# Patient Record
Sex: MALE | Race: BLACK OR AFRICAN AMERICAN | NOT HISPANIC OR LATINO | Employment: UNEMPLOYED | URBAN - METROPOLITAN AREA
[De-identification: names, ages, dates, MRNs, and addresses within clinical notes are randomized per-mention and may not be internally consistent; named-entity substitution may affect disease eponyms.]

---

## 2024-07-12 ENCOUNTER — HOSPITAL ENCOUNTER (EMERGENCY)
Facility: HOSPITAL | Age: 49
Discharge: HOME | End: 2024-07-12
Attending: EMERGENCY MEDICINE
Payer: MEDICARE

## 2024-07-12 VITALS
BODY MASS INDEX: 38.65 KG/M2 | HEART RATE: 73 BPM | SYSTOLIC BLOOD PRESSURE: 124 MMHG | RESPIRATION RATE: 18 BRPM | WEIGHT: 255 LBS | OXYGEN SATURATION: 97 % | HEIGHT: 68 IN | DIASTOLIC BLOOD PRESSURE: 82 MMHG | TEMPERATURE: 98 F

## 2024-07-12 DIAGNOSIS — U07.1 COVID-19: Primary | ICD-10-CM

## 2024-07-12 DIAGNOSIS — U07.1 COVID-19 VIRUS DETECTED: ICD-10-CM

## 2024-07-12 LAB
ALBUMIN SERPL BCP-MCNC: 3.7 G/DL (ref 3.5–5.2)
ALP SERPL-CCNC: 85 U/L (ref 55–135)
ALT SERPL W/O P-5'-P-CCNC: 30 U/L (ref 10–44)
AMPHET+METHAMPHET UR QL: NEGATIVE
ANION GAP SERPL CALC-SCNC: 9 MMOL/L (ref 8–16)
AST SERPL-CCNC: 30 U/L (ref 10–40)
BARBITURATES UR QL SCN>200 NG/ML: NEGATIVE
BASOPHILS # BLD AUTO: 0.03 K/UL (ref 0–0.2)
BASOPHILS NFR BLD: 0.5 % (ref 0–1.9)
BENZODIAZ UR QL SCN>200 NG/ML: NEGATIVE
BILIRUB SERPL-MCNC: 0.2 MG/DL (ref 0.1–1)
BILIRUB UR QL STRIP: NEGATIVE
BNP SERPL-MCNC: 4 PG/ML (ref 0–99)
BUN SERPL-MCNC: 22 MG/DL (ref 6–20)
BZE UR QL SCN: NEGATIVE
CALCIUM SERPL-MCNC: 8.9 MG/DL (ref 8.7–10.5)
CANNABINOIDS UR QL SCN: NEGATIVE
CHLORIDE SERPL-SCNC: 104 MMOL/L (ref 95–110)
CK SERPL-CCNC: 349 U/L (ref 20–200)
CLARITY UR: CLEAR
CO2 SERPL-SCNC: 22 MMOL/L (ref 23–29)
COLOR UR: YELLOW
CREAT SERPL-MCNC: 1.3 MG/DL (ref 0.5–1.4)
CREAT UR-MCNC: 196.9 MG/DL (ref 23–375)
DIFFERENTIAL METHOD BLD: ABNORMAL
EOSINOPHIL # BLD AUTO: 0.3 K/UL (ref 0–0.5)
EOSINOPHIL NFR BLD: 5 % (ref 0–8)
ERYTHROCYTE [DISTWIDTH] IN BLOOD BY AUTOMATED COUNT: 15.6 % (ref 11.5–14.5)
EST. GFR  (NO RACE VARIABLE): >60 ML/MIN/1.73 M^2
GLUCOSE SERPL-MCNC: 136 MG/DL (ref 70–110)
GLUCOSE UR QL STRIP: NEGATIVE
HCT VFR BLD AUTO: 44.7 % (ref 40–54)
HGB BLD-MCNC: 14.7 G/DL (ref 14–18)
HGB UR QL STRIP: ABNORMAL
IMM GRANULOCYTES # BLD AUTO: 0.02 K/UL (ref 0–0.04)
IMM GRANULOCYTES NFR BLD AUTO: 0.3 % (ref 0–0.5)
KETONES UR QL STRIP: NEGATIVE
LEUKOCYTE ESTERASE UR QL STRIP: NEGATIVE
LYMPHOCYTES # BLD AUTO: 1.7 K/UL (ref 1–4.8)
LYMPHOCYTES NFR BLD: 29.7 % (ref 18–48)
MAGNESIUM SERPL-MCNC: 2.2 MG/DL (ref 1.6–2.6)
MCH RBC QN AUTO: 29.9 PG (ref 27–31)
MCHC RBC AUTO-ENTMCNC: 32.9 G/DL (ref 32–36)
MCV RBC AUTO: 91 FL (ref 82–98)
MICROSCOPIC COMMENT: ABNORMAL
MONOCYTES # BLD AUTO: 0.7 K/UL (ref 0.3–1)
MONOCYTES NFR BLD: 11.6 % (ref 4–15)
NEUTROPHILS # BLD AUTO: 3.1 K/UL (ref 1.8–7.7)
NEUTROPHILS NFR BLD: 52.9 % (ref 38–73)
NITRITE UR QL STRIP: NEGATIVE
NRBC BLD-RTO: 0 /100 WBC
OHS QRS DURATION: 68 MS
OHS QTC CALCULATION: 429 MS
OPIATES UR QL SCN: NEGATIVE
PCP UR QL SCN>25 NG/ML: NEGATIVE
PH UR STRIP: 6 [PH] (ref 5–8)
PLATELET # BLD AUTO: 286 K/UL (ref 150–450)
PMV BLD AUTO: 8.3 FL (ref 9.2–12.9)
POTASSIUM SERPL-SCNC: 4.9 MMOL/L (ref 3.5–5.1)
PROT SERPL-MCNC: 8.4 G/DL (ref 6–8.4)
PROT UR QL STRIP: ABNORMAL
RBC # BLD AUTO: 4.92 M/UL (ref 4.6–6.2)
RBC #/AREA URNS HPF: 11 /HPF (ref 0–4)
SARS-COV-2 RDRP RESP QL NAA+PROBE: POSITIVE
SODIUM SERPL-SCNC: 135 MMOL/L (ref 136–145)
SP GR UR STRIP: >1.03 (ref 1–1.03)
TOXICOLOGY INFORMATION: NORMAL
TROPONIN I SERPL HS-MCNC: 2.8 PG/ML (ref 0–14.9)
URN SPEC COLLECT METH UR: ABNORMAL
UROBILINOGEN UR STRIP-ACNC: NEGATIVE EU/DL
WBC # BLD AUTO: 5.79 K/UL (ref 3.9–12.7)
WBC #/AREA URNS HPF: 1 /HPF (ref 0–5)

## 2024-07-12 PROCEDURE — 85025 COMPLETE CBC W/AUTO DIFF WBC: CPT | Performed by: EMERGENCY MEDICINE

## 2024-07-12 PROCEDURE — 82550 ASSAY OF CK (CPK): CPT | Performed by: EMERGENCY MEDICINE

## 2024-07-12 PROCEDURE — 83880 ASSAY OF NATRIURETIC PEPTIDE: CPT | Performed by: EMERGENCY MEDICINE

## 2024-07-12 PROCEDURE — 80307 DRUG TEST PRSMV CHEM ANLYZR: CPT | Performed by: EMERGENCY MEDICINE

## 2024-07-12 PROCEDURE — U0002 COVID-19 LAB TEST NON-CDC: HCPCS | Performed by: EMERGENCY MEDICINE

## 2024-07-12 PROCEDURE — 83735 ASSAY OF MAGNESIUM: CPT | Performed by: EMERGENCY MEDICINE

## 2024-07-12 PROCEDURE — 25000003 PHARM REV CODE 250: Performed by: EMERGENCY MEDICINE

## 2024-07-12 PROCEDURE — 96360 HYDRATION IV INFUSION INIT: CPT

## 2024-07-12 PROCEDURE — 99285 EMERGENCY DEPT VISIT HI MDM: CPT | Mod: 25

## 2024-07-12 PROCEDURE — 80053 COMPREHEN METABOLIC PANEL: CPT | Performed by: EMERGENCY MEDICINE

## 2024-07-12 PROCEDURE — 81001 URINALYSIS AUTO W/SCOPE: CPT | Performed by: EMERGENCY MEDICINE

## 2024-07-12 PROCEDURE — 84484 ASSAY OF TROPONIN QUANT: CPT | Performed by: EMERGENCY MEDICINE

## 2024-07-12 RX ORDER — SODIUM CHLORIDE 9 MG/ML
1000 INJECTION, SOLUTION INTRAVENOUS
Status: COMPLETED | OUTPATIENT
Start: 2024-07-12 | End: 2024-07-12

## 2024-07-12 RX ADMIN — SODIUM CHLORIDE 1000 ML: 9 INJECTION, SOLUTION INTRAVENOUS at 11:07

## 2024-07-12 NOTE — ED NOTES
Bed: [place] [address]  Expected date:   Expected time:   Means of arrival:   Comments:  paramore

## 2024-07-12 NOTE — DISCHARGE INSTRUCTIONS
Tylenol or Motrin if needed for fever   Home quarantine for the next 5 days  Return for any concerns or condition becomes worse  Follow-up as directed

## 2024-07-12 NOTE — ED PROVIDER NOTES
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  Patient states he works outside [employer] yesterday and thinks that he got dehydrated and his symptoms are lingering today.  Patient denies any chest pain shortness of breath abdominal pain nausea or vomiting to me.     The history is provided by the patient.     Review of patient's allergies indicates:  No Known Allergies  Past Medical History:   Diagnosis Date    Mixed hyperlipidemia     Obese body habitus      No past surgical history on file.  No family history on file.     Review of Systems   Constitutional:  Positive for fatigue. Negative for chills, fever and unexpected weight change.   HENT: Negative.     Respiratory: Negative.     Cardiovascular: Negative.    Gastrointestinal: Negative.    Genitourinary: Negative.    Musculoskeletal: Negative.    Neurological:  Negative for dizziness, tremors, syncope, facial asymmetry, speech difficulty, weakness and headaches.   Hematological: Negative.    Psychiatric/Behavioral: Negative.         Physical Exam     Initial Vitals [07/12/24 0949]   BP Pulse Resp Temp SpO2   130/83 94 18 98.3 °F (36.8 °C) 96 %      MAP       --         Physical Exam    Nursing note and vitals reviewed.  Constitutional: He appears well-developed and well-nourished.   HENT:   Head: Normocephalic.   Eyes: Conjunctivae and EOM are normal. Pupils are equal, round, and reactive to light.   Neck:   Normal range of motion.  Cardiovascular:  Normal rate, regular rhythm, normal heart sounds and intact distal pulses.           Pulmonary/Chest: Breath sounds normal.   Abdominal: Abdomen is soft. Bowel sounds are normal.   Musculoskeletal:      Cervical back:  Normal range of motion.     Neurological: He is alert and oriented to person, place, and time. He has normal strength.   Skin: Skin is warm.   Psychiatric: He has a normal mood and affect.         ED Course   Procedures  Labs Reviewed   CBC W/ AUTO DIFFERENTIAL - Abnormal; Notable for the following components:       Result Value    RDW 15.6 (*)     MPV 8.3 (*)     All other components within normal limits   COMPREHENSIVE METABOLIC PANEL - Abnormal; Notable for the following components:    Sodium 135 (*)     CO2 22 (*)     Glucose 136 (*)     BUN 22 (*)     All other components within normal limits   CK - Abnormal; Notable for the following components:     (*)     All other components within normal limits   URINALYSIS, REFLEX TO URINE CULTURE - Abnormal; Notable for the following components:    Specific Gravity, UA >1.030 (*)     Protein, UA Trace (*)     Occult Blood UA 2+ (*)     All other components within normal limits    Narrative:     Specimen Source->Urine   SARS-COV-2 RNA AMPLIFICATION, QUAL - Abnormal; Notable for the following components:    SARS-CoV-2 RNA, Amplification, Qual Positive (*)     All other components within normal limits   URINALYSIS MICROSCOPIC - Abnormal; Notable for the following components:    RBC, UA 11 (*)     All other components within normal limits    Narrative:     Specimen Source->Urine   MAGNESIUM   TROPONIN I HIGH SENSITIVITY   B-TYPE NATRIURETIC PEPTIDE   DRUG SCREEN PANEL, URINE EMERGENCY    Narrative:     Specimen Source->Urine   TROPONIN I HIGH SENSITIVITY        ECG Results              EKG 12-lead (In process)        Collection Time Result Time QRS Duration OHS QTC Calculation    07/12/24 10:01:29 07/12/24 10:03:56 68 429                     In process by Interface, Lab In [facility] (07/12/24 10:04:00)                   Narrative:    Test Reason : T67.5XXA,    Vent. Rate : 103 BPM     Atrial Rate : 103 BPM     P-R Int : 158 ms          QRS Dur : 068 ms      QT Int : 328  ms       P-R-T Axes : 078 051 073 degrees     QTc Int : 429 ms    Sinus tachycardia  Right atrial enlargement  Borderline Abnormal ECG  No previous ECGs available    Referred By: [email or web address]   SELF           Confirmed By:                                   Imaging Results              X-Ray Chest PA And Lateral (Final result)  Result time 07/12/24 10:55:30   Procedure changed from X-Ray Chest AP Portable     Final result by Kaleb Viveros MD (07/12/24 10:55:30)                   Impression:      No acute pulmonary process.      Electronically signed by: Kaleb Viveros  Date:    07/12/2024  Time:    10:55               Narrative:    EXAMINATION:  XR CHEST PA AND LATERAL    CLINICAL HISTORY:  Chest Pain;    COMPARISON:  None available    FINDINGS:  Cardiac silhouette size is borderline enlarged.  No airspace consolidation.  No pulmonary edema.  No pleural fluid or pneumothorax.  No acute osseous abnormality.                                       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  Patient states he works outside [employer] yesterday and thinks that he got dehydrated and his symptoms are lingering today.  Patient denies any chest pain shortness of breath abdominal pain nausea or vomiting to me.     The history is provided by the patient.     49-year-old male presents emergency department with a complaint of fatigue.  Patient states that he initially thought that he was dehydrated from working outside in the heat yesterday.  Patient had no further complaints other than feeling just tired.  Denies any fevers chest pain shortness of breath he has no focal neuro deficits.  CPK was mildly elevated at 380 was given a L of fluids chest x-ray is  unremarkable for any acute findings or pneumonia.  Sodium 135 remaining labs unremarkable.  COVID test is positive and consistent with patient's symptoms he has no signs of hypoxia or respiratory distress.  Patient will be discharged home given home quarantine instructions, given instructions on when to return., he was instructed to wear a mask and refrain from public surroundings for the next 5 days    Amount and/or Complexity of Data Reviewed  Labs: ordered. Decision-making details documented in ED Course.  Radiology: ordered. Decision-making details documented in ED Course.    Risk  Prescription drug management.                                I was personally available for consultation in the emergency department.  I have not seen this patient.    Juan J Wilson MD MPH  07/12/2024 1:15 PM      Clinical Impression:  Final diagnoses:  [U07.1] COVID-19 (Primary)          ED Disposition Condition    Discharge Stable          ED Prescriptions    None       Follow-up Information       Follow up With Specialties Details Why Contact Caryl    [facility]  Schedule an appointment as soon as possible for a visit in 3 days  [address] [facility] [address]  [phone]               Sheron Sorto FNP  07/12/24 131       Juan J Wilson Jr., MD  07/12/24 9739

## 2024-08-08 ENCOUNTER — HOSPITAL ENCOUNTER (EMERGENCY)
Facility: HOSPITAL | Age: 49
Discharge: HOME | End: 2024-08-08
Attending: EMERGENCY MEDICINE
Payer: MEDICARE

## 2024-08-08 VITALS
BODY MASS INDEX: 38.65 KG/M2 | DIASTOLIC BLOOD PRESSURE: 91 MMHG | HEART RATE: 81 BPM | TEMPERATURE: 99 F | HEIGHT: 68 IN | RESPIRATION RATE: 17 BRPM | OXYGEN SATURATION: 99 % | WEIGHT: 255 LBS | SYSTOLIC BLOOD PRESSURE: 141 MMHG

## 2024-08-08 DIAGNOSIS — T63.484A LOCAL REACTION TO INSECT STING, UNDETERMINED INTENT, INITIAL ENCOUNTER: ICD-10-CM

## 2024-08-08 DIAGNOSIS — T63.464A WASP STING, UNDETERMINED INTENT, INITIAL ENCOUNTER: Primary | ICD-10-CM

## 2024-08-08 PROCEDURE — 99284 EMERGENCY DEPT VISIT MOD MDM: CPT | Mod: 25

## 2024-08-08 PROCEDURE — 25000003 PHARM REV CODE 250: Performed by: EMERGENCY MEDICINE

## 2024-08-08 PROCEDURE — 96372 THER/PROPH/DIAG INJ SC/IM: CPT | Performed by: EMERGENCY MEDICINE

## 2024-08-08 PROCEDURE — 63600175 PHARM REV CODE 636 W HCPCS: Performed by: EMERGENCY MEDICINE

## 2024-08-08 RX ORDER — PREDNISONE 20 MG/1
20 TABLET ORAL DAILY
Qty: 5 TABLET | Refills: 0 | Status: SHIPPED | OUTPATIENT
Start: 2024-08-08 | End: 2024-08-13

## 2024-08-08 RX ORDER — DIPHENHYDRAMINE HCL 25 MG
25 CAPSULE ORAL
Status: COMPLETED | OUTPATIENT
Start: 2024-08-08 | End: 2024-08-08

## 2024-08-08 RX ORDER — CEPHALEXIN 500 MG/1
500 CAPSULE ORAL EVERY 12 HOURS
Qty: 14 CAPSULE | Refills: 0 | Status: SHIPPED | OUTPATIENT
Start: 2024-08-08 | End: 2024-08-15

## 2024-08-08 RX ORDER — METHYLPREDNISOLONE SOD SUCC 125 MG
125 VIAL (EA) INJECTION
Status: COMPLETED | OUTPATIENT
Start: 2024-08-08 | End: 2024-08-08

## 2024-08-08 RX ADMIN — DIPHENHYDRAMINE HYDROCHLORIDE 25 MG: 25 CAPSULE ORAL at 10:08

## 2024-08-08 RX ADMIN — METHYLPREDNISOLONE SODIUM SUCCINATE 125 MG: 125 INJECTION, POWDER, FOR SOLUTION INTRAMUSCULAR; INTRAVENOUS at 10:08

## 2024-08-08 NOTE — Clinical Note
"Parminder Glasgow" Paramore was seen and treated in our emergency department on 8/8/2024.  He may return to work on 08/10/2024.       If you have any questions or concerns, please don't hesitate to call.      Sheron Sorto, JONAS"

## 2024-08-08 NOTE — DISCHARGE INSTRUCTIONS
Zyrtec or Benadryl as directed   Take antibiotics as directed until all gone    Elevate extremity  Follow up as directed   Return for any concerns

## 2024-08-08 NOTE — ED PROVIDER NOTES
Encounter Date: 8/8/2024       History     Chief Complaint   Patient presents with    Insect Bite     Pt states he was stung by a ground wasp on his left hand yesterday. Hand and left arm are swollen, red, and warm.      49-year-old male presents emergency department reports that he was at work using a weed eater when he was stung by a wasp or a hornet to his left hand.  Patient reports that he took 2 Benadryl last night however when he woke up this morning his hand and forearm continued to be swollen.  Patient has no complaints of shortness of breath    The history is provided by the patient.     Review of patient's allergies indicates:  No Known Allergies  Past Medical History:   Diagnosis Date    Mixed hyperlipidemia     Obese body habitus      No past surgical history on file.  No family history on file.     Review of Systems   Constitutional:  Negative for chills and fever.   HENT: Negative.     Genitourinary: Negative.    Skin:         Swelling to the dorsal surface of the left hand, and forearm   Hematological: Negative.    Psychiatric/Behavioral: Negative.     All other systems reviewed and are negative.      Physical Exam     Initial Vitals [08/08/24 0939]   BP Pulse Resp Temp SpO2   (!) 137/95 82 16 99.2 °F (37.3 °C) 97 %      MAP       --         Physical Exam    Nursing note and vitals reviewed.  Constitutional: He appears well-developed and well-nourished.   HENT:   Head: Atraumatic.   Eyes: EOM are normal. Pupils are equal, round, and reactive to light.   Neck: Neck supple.   Normal range of motion.  Cardiovascular:  Normal rate, regular rhythm, normal heart sounds and intact distal pulses.           Pulmonary/Chest: Breath sounds normal.   Abdominal: Abdomen is soft. Bowel sounds are normal.   Musculoskeletal:      Cervical back: Normal range of motion and neck supple.     Neurological: He is alert and oriented to person, place, and time.   Skin: Skin is warm.   Patient has swelling to the dorsal  surface of the left hand with swelling to the left forearm the arm and hand are warm to touch no obvious erythema patient is able to make a complete fist and extend all fingers   Psychiatric: He has a normal mood and affect.         ED Course   Procedures  Labs Reviewed - No data to display       Imaging Results    None          Medications   diphenhydrAMINE capsule 25 mg (25 mg Oral Given 8/8/24 1017)   methylPREDNISolone sodium succinate injection 125 mg (125 mg Intramuscular Given 8/8/24 1016)     Medical Decision Making  49-year-old male presents emergency department reports that he was at work using a weed eater when he was stung by a wasp or a hornet to his left hand.  Patient reports that he took 2 Benadryl last night however when he woke up this morning his hand and forearm continued to be swollen.  Patient has no complaints of shortness of breath    The history is provided by the patient.     Considerations include but not limited to, localized allergic reaction, cellulitis    49-year-old well-appearing male presents emergency department after being stung by a wasp or hornet yesterday with swelling to the left hand and left forearm.  Patient is able to flex and extend all fingers of the left hand he does have swelling to the dorsal surface of the hand and extends the forearm he was given a dose of steroids and Benadryl in the emergency department he was observed his swelling has gotten significantly better he has no airway compromise and oxygenating well on room air.  The patient will be placed on prophylactic Keflex patient was given return precautions    Risk  OTC drugs.  Prescription drug management.                                      Clinical Impression:  Final diagnoses:  [T63.464A] Wasp sting, undetermined intent, initial encounter (Primary)  [T63.484A] Local reaction to insect sting, undetermined intent, initial encounter          ED Disposition Condition    Discharge Stable          ED  Prescriptions       Medication Sig Dispense Start Date End Date Auth. Provider    predniSONE (DELTASONE) 20 MG tablet Take 1 tablet (20 mg total) by mouth once daily. for 5 days 5 tablet 8/8/2024 8/13/2024 Sheron Sorto FNP    cephALEXin (KEFLEX) 500 MG capsule Take 1 capsule (500 mg total) by mouth every 12 (twelve) hours. for 7 days 14 capsule 8/8/2024 8/15/2024 Sheron Sorto FNP          Follow-up Information       Follow up With Specialties Details Why Contact Info    Leticia Soni, JONAS-C Family Medicine Schedule an appointment as soon as possible for a visit in 2 days  [address] [place] [id number]  [phone]               Sheron Sorto FNP  08/08/24 6849

## 2024-08-28 ENCOUNTER — HOSPITAL ENCOUNTER (EMERGENCY)
Facility: HOSPITAL | Age: 49
Discharge: HOME | End: 2024-08-28
Attending: EMERGENCY MEDICINE
Payer: MEDICARE

## 2024-08-28 VITALS
TEMPERATURE: 99 F | SYSTOLIC BLOOD PRESSURE: 136 MMHG | OXYGEN SATURATION: 99 % | HEART RATE: 78 BPM | WEIGHT: 255 LBS | BODY MASS INDEX: 38.77 KG/M2 | DIASTOLIC BLOOD PRESSURE: 88 MMHG | RESPIRATION RATE: 16 BRPM

## 2024-08-28 DIAGNOSIS — T14.90XA INJURY: ICD-10-CM

## 2024-08-28 DIAGNOSIS — S92.325A CLOSED NONDISPLACED FRACTURE OF SECOND METATARSAL BONE OF LEFT FOOT, INITIAL ENCOUNTER: Primary | ICD-10-CM

## 2024-08-28 PROCEDURE — 99283 EMERGENCY DEPT VISIT LOW MDM: CPT | Mod: 25

## 2024-08-28 NOTE — Clinical Note
"Parminder Glasgow" Paramore was seen and treated in our emergency department on 8/28/2024.  He may return to work after being cleared by follow-up physician .       If you have any questions or concerns, please don't hesitate to call.      Frannie Pearce PA-C"

## 2024-08-28 NOTE — ED PROVIDER NOTES
Encounter Date: 8/28/2024       History     Chief Complaint   Patient presents with    Left foot pain     Stepped in a hole 4 weeks ago and left foot has been increasingly hurting since.     Patient is a 49-year-old female who presents to the emergency department with left foot pain.  Patient reports 4 weeks ago he fell into a pot hole on his big piece of property.  Reports since then the foot has been swollen and tender.  Reports he has been walking on an injured foot for several weeks.  Reports he thought he should have it checked out.    The history is provided by the patient.     Review of patient's allergies indicates:  No Known Allergies  Past Medical History:   Diagnosis Date    Mixed hyperlipidemia     Obese body habitus      No past surgical history on file.  No family history on file.     Review of Systems   Constitutional:  Negative for activity change, appetite change, chills, fatigue and fever.   HENT:  Negative for congestion, ear discharge, ear pain, postnasal drip, rhinorrhea and sore throat.    Respiratory:  Negative for cough.    Cardiovascular:  Negative for chest pain.   Gastrointestinal:  Negative for abdominal pain.   Genitourinary:  Negative for dysuria.   Musculoskeletal:  Negative for back pain.        Foot pain   Neurological:  Negative for dizziness, light-headedness and headaches.       Physical Exam     Initial Vitals [08/28/24 0958]   BP Pulse Resp Temp SpO2   (!) 147/107 81 16 98.5 °F (36.9 °C) 99 %      MAP       --         Physical Exam    Nursing note and vitals reviewed.  Constitutional: He appears well-developed and well-nourished. He is not diaphoretic.  Non-toxic appearance. No distress.   HENT:   Head: Normocephalic.   Right Ear: External ear normal.   Left Ear: External ear normal.   Mouth/Throat: Oropharynx is clear and moist.   Eyes: Conjunctivae are normal. Pupils are equal, round, and reactive to light.   Neck:   Normal range of motion.  Cardiovascular:  Normal rate and  regular rhythm.           Pulmonary/Chest: Breath sounds normal.   Abdominal: Abdomen is soft. Bowel sounds are normal. There is no abdominal tenderness.   Musculoskeletal:      Cervical back: Normal range of motion.      Left foot: Decreased range of motion. Swelling, tenderness and bony tenderness present. Normal pulse.     Neurological: He is oriented to person, place, and time.   Skin: Skin is warm and dry. Capillary refill takes less than 2 seconds.   Psychiatric: He has a normal mood and affect.         ED Course   Procedures  Labs Reviewed - No data to display       Imaging Results              X-Ray Foot Complete Left (Final result)  Result time 08/28/24 10:37:04      Final result by Alfredo Proctor MD (08/28/24 10:37:04)                   Impression:      Suspicion for nondisplaced fracture of the proximal 2nd metatarsal; consider CT for further evaluation as clinically necessary.      Electronically signed by: Alfredo Proctor  Date:    08/28/2024  Time:    10:37               Narrative:    EXAMINATION:  XR FOOT COMPLETE 3 VIEW LEFT    CLINICAL HISTORY:  Dorsal left foot pain injury, unspecified, initial encounter    FINDINGS:  Three views of the left foot show linear lucency coursing through the proximal medial cortex of the 2nd metatarsal, suspicious for nondisplaced fracture.  There is no other definite acute fracture or dislocation, with normal bone mineralization.  There is dorsal soft tissue swelling.                                       Medications - No data to display  Medical Decision Making  Urgent evaluation of a 49-year-old male who presents to the emergency department with foot injury.  Patient is well-appearing.  There is dorsal swelling on the left foot.  Neurovascularly intact.  X-ray shows nondisplaced fracture of the proximal 2nd metatarsal.  Put in walking boot and given crutches.  Advised to follow up with Ortho.  Advised to return to the emergency department with any worsening symptoms or  concerns.                                      Clinical Impression:  Final diagnoses:  [T14.90XA] Injury                 [facility] Horizons Medical CenterFrannie Macias, JACQUIE  08/28/24 1209

## 2024-08-28 NOTE — FIRST PROVIDER EVALUATION
Emergency Department TeleTriage Encounter Note      CHIEF COMPLAINT    Chief Complaint   Patient presents with    Left foot pain     Stepped in a hole 4 weeks ago and left foot has been increasingly hurting since.       VITAL SIGNS   Initial Vitals [08/28/24 0958]   BP Pulse Resp Temp SpO2   (!) 147/107 81 16 98.5 °F (36.9 °C) 99 %      MAP       --            ALLERGIES    Review of patient's allergies indicates:  No Known Allergies    PROVIDER TRIAGE NOTE  Verbal consent for the teletriage evaluation was given by the patient at the start of the evaluation.  All efforts will be made to maintain patient's privacy during the evaluation.      This is a teletriage evaluation of a 49 y.o. male presenting to the ED with c/o left foot pain s/p injury 4 weeks ago. Limited physical exam via telehealth: The patient is awake, alert, answering questions appropriately and is not in respiratory distress.  As the Teletriage provider, I performed an initial assessment and ordered appropriate labs and imaging studies, if any, to facilitate the patient's care once placed in the ED. Once a room is available, care and a full evaluation will be completed by an alternate ED provider.  Any additional orders and the final disposition will be determined by that provider.  All imaging and labs will not be followed-up by the Teletriage Team, including myself.          ORDERS  Labs Reviewed - No data to display    ED Orders (720h ago, onward)      Start Ordered     Status Ordering Provider    08/28/24 1006 08/28/24 1005  X-Ray Foot Complete Left  1 time imaging         Ordered DONNIE CAMPBELL              Virtual Visit Note: The provider triage portion of this emergency department evaluation and documentation was performed via [employer], a HIPAA-compliant telemedicine application, in concert with a tele-presenter in the room. A face to face patient evaluation with one of my colleagues will occur once the patient is placed in an emergency  department room.      DISCLAIMER: This note was prepared with [employer] voice recognition transcription software. Garbled syntax, mangled pronouns, and other bizarre constructions may be attributed to that software system.

## 2024-08-28 NOTE — DISCHARGE INSTRUCTIONS
Elevate.  Rotate Tylenol and Motrin.  Follow up with Ortho.  Return to the emergency department with any worsening symptoms or concerns.

## 2024-09-10 ENCOUNTER — HOSPITAL ENCOUNTER (EMERGENCY)
Facility: HOSPITAL | Age: 49
Discharge: HOME | End: 2024-09-10
Attending: EMERGENCY MEDICINE
Payer: MEDICARE

## 2024-09-10 VITALS
RESPIRATION RATE: 18 BRPM | HEART RATE: 75 BPM | WEIGHT: 255 LBS | BODY MASS INDEX: 38.77 KG/M2 | TEMPERATURE: 98 F | SYSTOLIC BLOOD PRESSURE: 147 MMHG | OXYGEN SATURATION: 97 % | DIASTOLIC BLOOD PRESSURE: 95 MMHG

## 2024-09-10 DIAGNOSIS — M79.673 FOOT PAIN: ICD-10-CM

## 2024-09-10 DIAGNOSIS — S92.325A NONDISPLACED FRACTURE OF SECOND METATARSAL BONE, LEFT FOOT, INITIAL ENCOUNTER FOR CLOSED FRACTURE: Primary | ICD-10-CM

## 2024-09-10 PROCEDURE — 99283 EMERGENCY DEPT VISIT LOW MDM: CPT | Mod: 25

## 2024-09-10 NOTE — ED PROVIDER NOTES
Encounter Date: 9/10/2024       History     Chief Complaint   Patient presents with    Foot Pain     Patient seen two weeks ago for left foot pain - scheduled for therapy today but has a new onset of pain on top of left foot     49-year-old male presents emergency department complaint of left foot pain.  Patient was diagnosed several weeks ago with a 2nd metatarsal fracture he does not have any type of walking boot on he reports that he is bearing weight on his foot he states today he got up the bunk bed and jumped onto his foot and now hurts.  The patient denies falling up with the orthopedist he has no obvious signs of trauma noted to his foot.    The history is provided by the patient.     Review of patient's allergies indicates:  No Known Allergies  Past Medical History:   Diagnosis Date    Mixed hyperlipidemia     Obese body habitus      No past surgical history on file.  No family history on file.     Review of Systems   Constitutional: Negative.    Respiratory: Negative.     Cardiovascular: Negative.    Musculoskeletal:         Left foot pain   Neurological: Negative.    Psychiatric/Behavioral: Negative.     All other systems reviewed and are negative.      Physical Exam     Initial Vitals [09/10/24 0943]   BP Pulse Resp Temp SpO2   122/80 78 18 98.1 °F (36.7 °C) 98 %      MAP       --         Physical Exam    Nursing note and vitals reviewed.  Constitutional: He appears well-developed and well-nourished.   HENT:   Head: Normocephalic and atraumatic.   Cardiovascular:  Normal rate, regular rhythm and intact distal pulses.           Musculoskeletal:      Comments: Patient has mild tenderness to the dorsal surface of the left foot over the 2nd and 3rd metatarsal area.  There is wounds, no significant swelling, no signs of trauma.  Distal pulses are normal and intact.     Skin: Skin is warm and dry.         ED Course   Splint Application    Date/Time: 9/10/2024 11:41 AM    Performed by: Sheron Sorto,  FNP  Authorized by: Zainab Carrasco MD  Location details: left leg  Supplies used: aluminum splint  Post-procedure: The splinted body part was neurovascularly unchanged following the procedure.  Patient tolerance: Patient tolerated the procedure well with no immediate complications  Comments: Walking boot applied        Labs Reviewed - No data to display       Imaging Results              X-Ray Foot Complete Left (Final result)  Result time 09/10/24 10:57:27      Final result by oN Traore MD (09/10/24 10:57:27)                   Impression:      Healing fracture of the proximal 2nd metatarsal    No new fractures      Electronically signed by: No Traore  Date:    09/10/2024  Time:    10:57               Narrative:    CLINICAL HISTORY:  (MRN [id number])48 y/o  (1975) M    Pain in unspecified foot    TECHNIQUE:  (A# [id number], Exam time 9/10/2024 10:53)    [id number] XR FOOT COMPLETE 3 VIEW LEFT  view(s) obtained.    COMPARISON:  08/28/2024    FINDINGS:  There is periosteal reaction involving the proximal 2nd metatarsal compatible with healing fracture.  The previously noted lucency is not definitely visualized.    There are no new fractures or osseous abnormalities.  The joint spaces are maintained.                                       Medications - No data to display  Medical Decision Making  49-year-old male presents emergency department complaint of left foot pain.  Patient was diagnosed several weeks ago with a 2nd metatarsal fracture he does not have any type of walking boot on he reports that he is bearing weight on his foot he states today he got up the bunk bed and jumped onto his foot and now hurts.  The patient denies falling up with the orthopedist he has no obvious signs of trauma noted to his foot.    The history is provided by the patient.     Considerations include but not limited to, fracture, contusion, dislocation    49-year-old male presents to the emergency department recently  diagnosed with a 2nd metatarsal fracture has not followed up with the orthopedist states he got out of the bunk earlier today when he re-injured his foot.  Patient has no obvious signs of trauma or swelling noted.  X-ray imaging reveals healing fracture to the 2nd metatarsal.  Placed in a walking boot he was instructed follow up with orthopedist given return precautions    Amount and/or Complexity of Data Reviewed  Radiology: ordered. Decision-making details documented in ED Course.                                      Clinical Impression:  Final diagnoses:  [M79.673] Foot pain                 Sheron Sorto, JONAS  09/10/24 1147       Sheron Sorto, JONAS  09/10/24 1152

## 2024-09-10 NOTE — DISCHARGE INSTRUCTIONS
Motrin or Tylenol for pain   Follow up as directed with orthopedist for definitive care  Return for any concerns

## 2024-09-18 DIAGNOSIS — M79.672 LEFT FOOT PAIN: Primary | ICD-10-CM

## 2024-09-20 ENCOUNTER — OFFICE VISIT (OUTPATIENT)
Dept: ORTHOPEDICS | Facility: CLINIC | Age: 49
End: 2024-09-20
Payer: MEDICARE

## 2024-09-20 ENCOUNTER — HOSPITAL ENCOUNTER (OUTPATIENT)
Dept: RADIOLOGY | Facility: HOSPITAL | Age: 49
Discharge: HOME | End: 2024-09-20
Attending: ORTHOPAEDIC SURGERY
Payer: MEDICARE

## 2024-09-20 ENCOUNTER — TELEPHONE (OUTPATIENT)
Dept: ORTHOPEDICS | Facility: CLINIC | Age: 49
End: 2024-09-20
Payer: MEDICARE

## 2024-09-20 VITALS — BODY MASS INDEX: 38.65 KG/M2 | WEIGHT: 255.06 LBS | HEIGHT: 68 IN

## 2024-09-20 DIAGNOSIS — M21.6X2 ACQUIRED CAVOVARUS DEFORMITY OF LEFT FOOT: Primary | ICD-10-CM

## 2024-09-20 DIAGNOSIS — M79.672 LEFT FOOT PAIN: ICD-10-CM

## 2024-09-20 DIAGNOSIS — S92.325A CLOSED NONDISPLACED FRACTURE OF SECOND METATARSAL BONE OF LEFT FOOT, INITIAL ENCOUNTER: ICD-10-CM

## 2024-09-20 PROCEDURE — 73630 X-RAY EXAM OF FOOT: CPT | Mod: 26,LT,, | Performed by: RADIOLOGY

## 2024-09-20 PROCEDURE — 73610 X-RAY EXAM OF ANKLE: CPT | Mod: 26,LT,, | Performed by: RADIOLOGY

## 2024-09-20 PROCEDURE — 73610 X-RAY EXAM OF ANKLE: CPT | Mod: TC,LT

## 2024-09-20 PROCEDURE — 99999 PR PBB SHADOW E&M-EST. PATIENT-LVL II: CPT | Mod: PBBFAC,,, | Performed by: ORTHOPAEDIC SURGERY

## 2024-09-20 PROCEDURE — 73630 X-RAY EXAM OF FOOT: CPT | Mod: TC,LT

## 2024-09-20 NOTE — TELEPHONE ENCOUNTER
Attempted to contact Mr. Zurita. A house representative answered the phone. Patient representative will pass along the plan of care to Mr. Zurita. Postoperative shoe provided today.  Patient may weightbear as tolerated left lower extremity. He is to refrain from any high impact activities-running, jumping, etc..  May wean off the crutches he feels comfortable. Return to clinic in 1 month for repeat evaluation and x-ray. Representative verbalized understanding.

## 2024-09-20 NOTE — PROGRESS NOTES
Status/Diagnosis: Bilateral cavovarus; Metatarsus adductus; Subacute Left 2nd MT shaft fracture.  Date of Surgery: none  Date of Injury: July 2024  Return visit: 1 month  X-rays on Return: WB 3-views Left foot    Chief Complaint: Left foot pain    Present History:  Patient presents today via referral from Frannie Ashby*   Parminder Treviño is a 49 y.o. male who presents today for new patient evaluation.  Vague history of injury 2+ months ago.  States he was weed eating when he stepped into a hole and twisted the foot.  Did not seek treatment until approximately 4 weeks ago.  X-rays at that time were consistent with fracture.  He was placed into a boot with instructions for outpatient orthopedic follow-up.  Currently he was full weight-bearing in his tall boot and using a single crutch.    3/10 pain.  Denies any left foot/ankle pain or instability prior to the above.    Otherwise healthy.  Smokes a cigar occasionally.      Past Medical History:   Diagnosis Date    Mixed hyperlipidemia     Obese body habitus        No past surgical history on file.    No current outpatient medications on file.     No current facility-administered medications for this visit.       Review of patient's allergies indicates:  No Known Allergies    No family history on file.    Social History     Socioeconomic History    Marital status: Single       Physical exam:  There were no vitals filed for this visit.  There is no height or weight on file to calculate BMI.  General: In no apparent distress; well developed and well nourished.  HEENT: normocephalic; atraumatic.  Cardiovascular: regular rate.  Respiratory: no increased work of breathing.  Musculoskeletal:   Gait: nonantalgic; cavovarus with lateral overload.  Inspection:   Patient was significant bilateral cavovarus foot deformity.  This is for the most part passively correctable.  Griffin block testing not available today.    Patient localizes all pain to the dorsal midfoot with  moderate tenderness on deep palpation of the 2nd metatarsal shaft.  Palpable callus is present.  No ATFL tenderness.  No alice laxity with anterior drawer or varus/valgus talar tilt testing.  Pain or laxity with Lisfranc stress.  Silfverskiold: Positive  Alignment:  Knee: neutral               Ankle: neutral              Hindfoot: cavovarus              Forefoot:  Adductus  Strength:              Dorsiflexion 5/5  Plantar flexion 5/5  Inversion 5/5  Eversion 5/5  Sensation:              SILT distally  ROM:              Ankle: full and painless              Subtalar: full and painless  Pulses: Palpable pedal pulse                   Imaging Studies/Outside documentation:  I have ordered/reviewed/interpreted the following images/outside documentation:  1. Weight-bearing 3-views of Left foot and ankle:   On my independent review, subacute nondisplaced fracture of the proximal 2nd metatarsal diaphysis.  Significant callus formation present.  Overall acceptable alignment.  Moderate metatarsus adductus.  Calcaneal pitch within normal limits. (+) talonavicular over coverage.  Ankle mortise remains congruent.  No significant degenerative joint changes.        Assessment:  Parminder Treviño is a 49 y.o. male with Bilateral cavovarus; Metatarsus adductus; Subacute Left 2nd MT shaft fracture.    Plan:   Clinical and radiographic findings were discussed.  Continue to recommend conservative management to include rigid soled shoe wear.  Postoperative shoe provided today.  Patient may weightbear as tolerated left lower extremity.  He is to refrain from any high impact activities-running, jumping, etc..  May wean off the crutches he feels comfortable.    Return to clinic in 1 month for repeat evaluation and x-ray.  Possible transition out of the postoperative shoe at that time.    We did discuss potentially obtaining off-the-shelf versus custom inserts for congenital bilateral cavovarus foot deformity.    We performed a custom  orthotic/brace fitting, adjusting and training with the patient. The patient demonstrated understanding and proper care. This was performed for 15 minutes.    This note was created using voice recognition software and may contain grammatical errors.

## 2024-10-07 DIAGNOSIS — S92.325A CLOSED NONDISPLACED FRACTURE OF SECOND METATARSAL BONE OF LEFT FOOT, INITIAL ENCOUNTER: Primary | ICD-10-CM

## 2024-10-11 ENCOUNTER — OFFICE VISIT (OUTPATIENT)
Dept: ORTHOPEDICS | Facility: CLINIC | Age: 49
End: 2024-10-11
Payer: MEDICARE

## 2024-10-11 ENCOUNTER — HOSPITAL ENCOUNTER (OUTPATIENT)
Dept: RADIOLOGY | Facility: HOSPITAL | Age: 49
Discharge: HOME | End: 2024-10-11
Attending: ORTHOPAEDIC SURGERY
Payer: MEDICARE

## 2024-10-11 DIAGNOSIS — S92.325A CLOSED NONDISPLACED FRACTURE OF SECOND METATARSAL BONE OF LEFT FOOT, INITIAL ENCOUNTER: ICD-10-CM

## 2024-10-11 DIAGNOSIS — S92.325A CLOSED NONDISPLACED FRACTURE OF SECOND METATARSAL BONE OF LEFT FOOT, INITIAL ENCOUNTER: Primary | ICD-10-CM

## 2024-10-11 PROCEDURE — 99999 PR PBB SHADOW E&M-EST. PATIENT-LVL I: CPT | Mod: PBBFAC,,, | Performed by: ORTHOPAEDIC SURGERY

## 2024-10-11 PROCEDURE — 73630 X-RAY EXAM OF FOOT: CPT | Mod: TC,LT

## 2024-10-11 PROCEDURE — 73630 X-RAY EXAM OF FOOT: CPT | Mod: 26,LT,, | Performed by: RADIOLOGY

## 2024-10-11 NOTE — PROGRESS NOTES
Status/Diagnosis: Bilateral cavovarus; Metatarsus adductus; Subacute Left 2nd MT shaft fracture.  Date of Surgery: none  Date of Injury: July 2024  Return visit: PRN  X-rays on Return: pending patient complaint    Chief Complaint: Left foot pain    Present History:  Patient presents today via referral from No ref. provider found   Parminder Treviño is a 49 y.o. male who presents today for new patient evaluation.  Vague history of injury 2+ months ago.  States he was weed eating when he stepped into a hole and twisted the foot.  Did not seek treatment until approximately 4 weeks ago.  X-rays at that time were consistent with fracture.  He was placed into a boot with instructions for outpatient orthopedic follow-up.  Currently he was full weight-bearing in his tall boot and using a single crutch.    3/10 pain.  Denies any left foot/ankle pain or instability prior to the above.    Otherwise healthy.  Smokes a cigar occasionally.    10/11/2024:  Patient returns today for repeat clinical evaluation and x-ray.  He was full weight-bearing in his postoperative shoe.  5/10 pain.  No new injuries.  Has not been able to obtain the increased arch support foot scientific inserts since last being seen.      Past Medical History:   Diagnosis Date    Mixed hyperlipidemia     Obese body habitus        No past surgical history on file.    No current outpatient medications on file.     No current facility-administered medications for this visit.       Review of patient's allergies indicates:  No Known Allergies    No family history on file.    Social History     Socioeconomic History    Marital status: Single       Physical exam:  There were no vitals filed for this visit.  There is no height or weight on file to calculate BMI.  General: In no apparent distress; well developed and well nourished.  HEENT: normocephalic; atraumatic.  Cardiovascular: regular rate.  Respiratory: no increased work of breathing.  Musculoskeletal:   Gait:  nonantalgic; cavovarus with lateral overload.  Inspection:   Patient was significant bilateral cavovarus foot deformity.  This is for the most part passively correctable.  Griffin block testing not available today.    Patient localizes all pain to the dorsal midfoot.  Palpable callus over the 2nd metatarsal shaft.  Still with mild tenderness on deep palpation but improved.  No ATFL tenderness.  No alice laxity with anterior drawer or varus/valgus talar tilt testing.  Pain or laxity with Lisfranc stress.  Silfverskiold: Positive  Alignment:  Knee: neutral               Ankle: neutral              Hindfoot: cavovarus              Forefoot:  Adductus  Strength:              Dorsiflexion 5/5  Plantar flexion 5/5  Inversion 5/5  Eversion 5/5  Sensation:              SILT distally  ROM:              Ankle: full and painless              Subtalar: full and painless  Pulses: Palpable pedal pulse                   Imaging Studies/Outside documentation:  I have ordered/reviewed/interpreted the following images/outside documentation:  1. Weight-bearing 3-views of Left foot and ankle:   On my independent review, persistent nondisplaced fracture of the proximal 2nd metatarsal diaphysis.  Still with significant callus formation and some degree of bone consolidation present.  Overall acceptable alignment.  Moderate metatarsus adductus.  Calcaneal pitch within normal limits. (+) talonavicular over coverage.        Assessment:  Parminder Treviño is a 49 y.o. male with Bilateral cavovarus; Metatarsus adductus; Subacute Left 2nd MT shaft fracture.    Plan:   Clinical and radiographic findings were discussed.    Interval bone consolidation with large amount of adjacent callus as previously noted.    Patient may begin transitioning out of the postoperative shoe whenever he was able to obtain the foot scientific cavovarus arch support inserts as previously discussed.    Weightbear as tolerated left lower extremity.    Patient voiced  understanding.  All questions were answered.  He will follow up on an as-needed basis.      This note was created using voice recognition software and may contain grammatical errors.

## 2024-10-21 ENCOUNTER — TELEPHONE (OUTPATIENT)
Dept: ORTHOPEDICS | Facility: CLINIC | Age: 49
End: 2024-10-21
Payer: MEDICARE

## 2024-10-21 NOTE — TELEPHONE ENCOUNTER
----- Message from Med Assistant Boyd sent at 10/21/2024  1:20 PM CDT -----  Contact: patient  FYI--Needs a Work Release with Return to Work date on it e-mailed to:  [email or web address].

## 2024-11-27 ENCOUNTER — PATIENT MESSAGE (OUTPATIENT)
Dept: RESEARCH | Facility: HOSPITAL | Age: 49
End: 2024-11-27
Payer: MEDICARE

## 2025-05-09 ENCOUNTER — HOSPITAL ENCOUNTER (EMERGENCY)
Facility: HOSPITAL | Age: 50
Discharge: HOME | End: 2025-05-09
Attending: EMERGENCY MEDICINE
Payer: MEDICARE

## 2025-05-09 VITALS
HEART RATE: 80 BPM | SYSTOLIC BLOOD PRESSURE: 137 MMHG | DIASTOLIC BLOOD PRESSURE: 94 MMHG | TEMPERATURE: 98 F | OXYGEN SATURATION: 98 % | WEIGHT: 265 LBS | HEIGHT: 68 IN | RESPIRATION RATE: 18 BRPM | BODY MASS INDEX: 40.16 KG/M2

## 2025-05-09 DIAGNOSIS — I10 HYPERTENSION: ICD-10-CM

## 2025-05-09 LAB
ABSOLUTE EOSINOPHIL (SMH): 0.32 K/UL
ABSOLUTE MONOCYTE (SMH): 0.59 K/UL (ref 0.3–1)
ABSOLUTE NEUTROPHIL COUNT (SMH): 2.9 K/UL (ref 1.8–7.7)
ALBUMIN SERPL-MCNC: 3.7 G/DL (ref 3.5–5.2)
ALP SERPL-CCNC: 88 UNIT/L (ref 55–135)
ALT SERPL-CCNC: 12 UNIT/L (ref 10–44)
ANION GAP (SMH): 5 MMOL/L (ref 8–16)
AST SERPL-CCNC: 17 UNIT/L (ref 10–40)
BASOPHILS # BLD AUTO: 0.04 K/UL
BASOPHILS NFR BLD AUTO: 0.7 %
BILIRUB SERPL-MCNC: 0.2 MG/DL (ref 0.1–1)
BNP SERPL-MCNC: 15 PG/ML
BUN SERPL-MCNC: 12 MG/DL (ref 6–20)
CALCIUM SERPL-MCNC: 9.4 MG/DL (ref 8.7–10.5)
CHLORIDE SERPL-SCNC: 104 MMOL/L (ref 95–110)
CO2 SERPL-SCNC: 26 MMOL/L (ref 23–29)
CREAT SERPL-MCNC: 1 MG/DL (ref 0.5–1.4)
ERYTHROCYTE [DISTWIDTH] IN BLOOD BY AUTOMATED COUNT: 14.6 % (ref 11.5–14.5)
GFR SERPLBLD CREATININE-BSD FMLA CKD-EPI: >60 ML/MIN/1.73/M2
GLUCOSE SERPL-MCNC: 96 MG/DL (ref 70–110)
HCT VFR BLD AUTO: 40.7 % (ref 40–54)
HGB BLD-MCNC: 13.3 GM/DL (ref 14–18)
IMM GRANULOCYTES # BLD AUTO: 0.01 K/UL (ref 0–0.04)
IMM GRANULOCYTES NFR BLD AUTO: 0.2 % (ref 0–0.5)
INR PPP: 1 (ref 0.8–1.2)
LYMPHOCYTES # BLD AUTO: 1.91 K/UL (ref 1–4.8)
MAGNESIUM SERPL-MCNC: 1.9 MG/DL (ref 1.6–2.6)
MCH RBC QN AUTO: 28 PG (ref 27–31)
MCHC RBC AUTO-ENTMCNC: 32.7 G/DL (ref 32–36)
MCV RBC AUTO: 86 FL (ref 82–98)
NUCLEATED RBC (/100WBC) (SMH): 0 /100 WBC
PLATELET # BLD AUTO: 331 K/UL (ref 150–450)
PMV BLD AUTO: 8.2 FL (ref 9.2–12.9)
POTASSIUM SERPL-SCNC: 4.3 MMOL/L (ref 3.5–5.1)
PROT SERPL-MCNC: 8 GM/DL (ref 6–8.4)
PROTHROMBIN TIME: 10.7 SECONDS (ref 9–12.5)
RBC # BLD AUTO: 4.75 M/UL (ref 4.6–6.2)
RELATIVE EOSINOPHIL (SMH): 5.6 % (ref 0–8)
RELATIVE LYMPHOCYTE (SMH): 33.3 % (ref 18–48)
RELATIVE MONOCYTE (SMH): 10.3 % (ref 4–15)
RELATIVE NEUTROPHIL (SMH): 49.9 % (ref 38–73)
SODIUM SERPL-SCNC: 135 MMOL/L (ref 136–145)
TROPONIN HIGH SENSITIVE (SMH): 2.4 PG/ML
WBC # BLD AUTO: 5.74 K/UL (ref 3.9–12.7)

## 2025-05-09 PROCEDURE — 99285 EMERGENCY DEPT VISIT HI MDM: CPT | Mod: 25

## 2025-05-09 PROCEDURE — 83735 ASSAY OF MAGNESIUM: CPT | Performed by: EMERGENCY MEDICINE

## 2025-05-09 PROCEDURE — 83880 ASSAY OF NATRIURETIC PEPTIDE: CPT | Performed by: EMERGENCY MEDICINE

## 2025-05-09 PROCEDURE — 85610 PROTHROMBIN TIME: CPT | Performed by: EMERGENCY MEDICINE

## 2025-05-09 PROCEDURE — 84484 ASSAY OF TROPONIN QUANT: CPT | Performed by: EMERGENCY MEDICINE

## 2025-05-09 PROCEDURE — 84075 ASSAY ALKALINE PHOSPHATASE: CPT | Performed by: EMERGENCY MEDICINE

## 2025-05-09 PROCEDURE — 93010 ELECTROCARDIOGRAM REPORT: CPT | Mod: ,,, | Performed by: GENERAL PRACTICE

## 2025-05-09 PROCEDURE — 85025 COMPLETE CBC W/AUTO DIFF WBC: CPT | Performed by: EMERGENCY MEDICINE

## 2025-05-09 PROCEDURE — 93005 ELECTROCARDIOGRAM TRACING: CPT | Performed by: GENERAL PRACTICE

## 2025-05-09 RX ORDER — AMLODIPINE BESYLATE 5 MG/1
5 TABLET ORAL DAILY
Qty: 90 TABLET | Refills: 3 | Status: SHIPPED | OUTPATIENT
Start: 2025-05-09 | End: 2026-05-09

## 2025-05-09 NOTE — ED PROVIDER NOTES
Encounter Date: 5/9/2025       History     Chief Complaint   Patient presents with    Hypertension     Today was  high at dentist office, sent to er for further eval     49-year-old male went to see a dentist for dental work and noted his blood pressure was high.  Patient otherwise feels well.  Denies fever or chills or nausea vomiting or chest pain or shortness of breath or abdominal pain or weakness or numbness.      Review of patient's allergies indicates:  No Known Allergies  Past Medical History:   Diagnosis Date    Mixed hyperlipidemia     Obese body habitus      History reviewed. No pertinent surgical history.  No family history on file.  Social History[1]  Review of Systems   Constitutional: Negative.    HENT: Negative.     Eyes: Negative.    Respiratory: Negative.     Cardiovascular: Negative.    Gastrointestinal: Negative.    Endocrine: Negative.    Genitourinary: Negative.    Musculoskeletal: Negative.    Skin: Negative.    Allergic/Immunologic: Negative.    Neurological: Negative.    Hematological: Negative.    Psychiatric/Behavioral: Negative.     All other systems reviewed and are negative.      Physical Exam     Initial Vitals [05/09/25 1102]   BP Pulse Resp Temp SpO2   131/75 79 17 98.4 °F (36.9 °C) 98 %      MAP       --         Physical Exam    Nursing note and vitals reviewed.  Constitutional: He appears well-developed and well-nourished.   HENT:   Head: Normocephalic and atraumatic.   Nose: Nose normal.   Eyes: Conjunctivae and EOM are normal.   Neck: No tracheal deviation present.   Normal range of motion.  Cardiovascular:  Normal rate, regular rhythm, normal heart sounds and intact distal pulses.     Exam reveals no friction rub.       No murmur heard.  Pulmonary/Chest: Breath sounds normal. No respiratory distress. He has no wheezes. He has no rales.   Abdominal: Abdomen is soft. He exhibits no distension. There is no abdominal tenderness.   Musculoskeletal:         General: Normal range of  motion.      Cervical back: Normal range of motion.     Neurological: He is alert and oriented to person, place, and time. He has normal strength.   Skin: Skin is warm and dry. Capillary refill takes less than 2 seconds.   Psychiatric: He has a normal mood and affect. Thought content normal.         ED Course   Procedures  Labs Reviewed   COMPREHENSIVE METABOLIC PANEL - Abnormal       Result Value    Sodium 135 (*)     Potassium 4.3      Chloride 104      CO2 26      Glucose 96      BUN 12      Creatinine 1.0      Calcium 9.4      Protein Total 8.0      Albumin 3.7      Bilirubin Total 0.2      ALP 88      AST 17      ALT 12      Anion Gap 5 (*)     eGFR >60     CBC WITH DIFFERENTIAL - Abnormal    WBC 5.74      RBC 4.75      Hgb 13.3 (*)     Hct 40.7      MCV 86      MCH 28.0      MCHC 32.7      RDW 14.6 (*)     Platelet Count 331      MPV 8.2 (*)     Nucleated RBC 0      Neut % 49.9      Lymph % 33.3      Mono % 10.3      Eos % 5.6      Basophil % 0.7      Imm Grans % 0.2      Neut # 2.9      Lymph # 1.91      Mono # 0.59      Eos # 0.32      Baso # 0.04      Imm Grans # 0.01     B-TYPE NATRIURETIC PEPTIDE - Normal    BNP 15     MAGNESIUM - Normal    Magnesium 1.9     TROPONIN I HIGH SENSITIVITY - Normal    Troponin High Sensitive 2.4     PROTIME-INR - Normal    PT 10.7      INR 1.0     CBC W/ AUTO DIFFERENTIAL    Narrative:     The following orders were created for panel order CBC auto differential.  Procedure                               Abnormality         Status                     ---------                               -----------         ------                     CBC with Differential[[id number]]       Abnormal            Final result                 Please view results for these tests on the individual orders.   EXTRA TUBES    Narrative:     The following orders were created for panel order EXTRA TUBES.  Procedure                               Abnormality         Status                     ---------                                -----------         ------                     Lavender Top Hold[[id number]]                               In process                 Gold Top Hold[[id number]]                                   In process                   Please view results for these tests on the individual orders.   LAVENDER TOP HOLD   GOLD TOP HOLD     EKG Readings: (Independently Interpreted)   Initial Reading: No STEMI. Rhythm: Normal Sinus Rhythm. Ectopy: No Ectopy. Conduction: Normal.     ECG Results              EKG 12-lead (In process)        Collection Time Result Time QRS Duration OHS QTC Calculation    05/09/25 11:05:26 05/09/25 11:09:41 74 417                     In process by Interface, Lab In [facility] (05/09/25 11:09:44)                   Narrative:    Test Reason : I10,    Vent. Rate :  80 BPM     Atrial Rate :  80 BPM     P-R Int : 188 ms          QRS Dur :  74 ms      QT Int : 362 ms       P-R-T Axes : -13   4  -7 degrees    QTcB Int : 417 ms    Normal sinus rhythm  Normal ECG  When compared with ECG of 12-Jul-2024 10:01,  T wave inversion now evident in Inferior leads    Referred By:            Confirmed By:                                   Imaging Results              X-Ray Chest AP Portable (Final result)  Result time 05/09/25 12:23:57      Final result by No Traore MD (05/09/25 12:23:57)                   Impression:      No acute cardiopulmonary abnormality.    Mild increase interstitial markings in the lung bases which are stable in suggestive of chronic fibrotic changes      Electronically signed by: No Traore  Date:    05/09/2025  Time:    12:23               Narrative:    EXAMINATION:  XR CHEST AP PORTABLE    CLINICAL HISTORY:  hypertension;    FINDINGS:  Portable chest at is compared to 07/12/2024 without comparisons shows normal cardiomediastinal silhouette.    Mild increase interstitial markings in the lung bases suggestive of chronic changes.  There are no confluent infiltrates  or pleural effusions.  Pulmonary vasculature is normal. No acute osseous abnormality.                                       Medications - No data to display  Medical Decision Making  49-year-old male presented emergency department with elevated blood pressure.  Patient's blood pressure gradually improved while in the emergency department and it could be high because of anxiety however patient had several times had blood pressure checks which were high so will start on a low-dose antihypertensive medication and patient to check his blood pressure daily and record it and if is persistently stays high to start blood pressure medication.  Low-salt diet recommended.  Screening labs and cardiac workup unremarkable.  Discharged with return precautions and instructions and follow-up    Amount and/or Complexity of Data Reviewed  Labs: ordered.  Radiology: ordered.    Risk  Prescription drug management.                                      Clinical Impression:  Final diagnoses:  [I10] Hypertension          ED Disposition Condition    Discharge Stable          ED Prescriptions       Medication Sig Dispense Start Date End Date Auth. Provider    amLODIPine (NORVASC) 5 MG tablet Take 1 tablet (5 mg total) by mouth once daily. 90 tablet 5/9/2025 5/9/2026 Chito Shipman MD          Follow-up Information       Follow up With Specialties Details Why Contact Info    Leticia Soni, FNP-C Family Medicine In 2 days  [address] [place] [id number]  [phone]                 [1]   Social History  Tobacco Use    Smoking status: Unknown        Chito Shipman MD  05/09/25 5649

## 2025-05-09 NOTE — FIRST PROVIDER EVALUATION
"Medical screening examination initiated.  I have conducted a focused provider triage encounter, findings are as follows:    Brief history of present illness:  Patient presents to ED for concern for elevated blood pressure.  Patient reports he went to has a dental procedure and they told him it would blood pressure was elevated 3 times and needed to come to the emergency department.  Patient reports previous headache but denies any currently.  Patient denies dizziness, vision changes, chest pain, or shortness breath.    Vitals:    05/09/25 1102   BP: 131/75   BP Location: Left arm   Pulse: 79   Resp: 17   Temp: 98.4 °F (36.9 °C)   TempSrc: Oral   SpO2: 98%   Weight: 120.2 kg (265 lb)   Height: 5' 8" (1.727 m)       Pertinent physical exam:  Patient is awake and alert in no acute distress.    Brief workup plan:  Vitals, EKG    Preliminary workup initiated; this workup will be continued and followed by the physician or advanced practice provider that is assigned to the patient when roomed.  "

## 2025-05-09 NOTE — DISCHARGE INSTRUCTIONS
Check your blood pressure daily for the next week and if your blood pressure is staying high you would need blood pressure medication.  Follow-up with your primary care provider.  Low-salt diet.  Rest.  Return for worsening symptoms or any problems

## 2025-05-10 LAB
OHS QRS DURATION: 74 MS
OHS QTC CALCULATION: 417 MS

## 2025-05-17 ENCOUNTER — HOSPITAL ENCOUNTER (EMERGENCY)
Facility: HOSPITAL | Age: 50
Discharge: HOME | End: 2025-05-17
Attending: STUDENT IN AN ORGANIZED HEALTH CARE EDUCATION/TRAINING PROGRAM
Payer: MEDICARE

## 2025-05-17 VITALS
OXYGEN SATURATION: 99 % | BODY MASS INDEX: 40.16 KG/M2 | HEART RATE: 99 BPM | SYSTOLIC BLOOD PRESSURE: 128 MMHG | DIASTOLIC BLOOD PRESSURE: 86 MMHG | RESPIRATION RATE: 20 BRPM | HEIGHT: 68 IN | TEMPERATURE: 98 F | WEIGHT: 265 LBS

## 2025-05-17 DIAGNOSIS — J18.9 PNEUMONIA OF RIGHT MIDDLE LOBE DUE TO INFECTIOUS ORGANISM: ICD-10-CM

## 2025-05-17 DIAGNOSIS — R05.9 COUGH: ICD-10-CM

## 2025-05-17 DIAGNOSIS — R09.81 SINUS CONGESTION: Primary | ICD-10-CM

## 2025-05-17 LAB
GROUP A STREP MOLECULAR (OHS): NEGATIVE
INFLUENZA A MOLECULAR (OHS): NEGATIVE
INFLUENZA B MOLECULAR (OHS): NEGATIVE
SARS-COV-2 RDRP RESP QL NAA+PROBE: NEGATIVE

## 2025-05-17 PROCEDURE — U0002 COVID-19 LAB TEST NON-CDC: HCPCS | Performed by: EMERGENCY MEDICINE

## 2025-05-17 PROCEDURE — 99284 EMERGENCY DEPT VISIT MOD MDM: CPT | Mod: 25

## 2025-05-17 PROCEDURE — 87502 INFLUENZA DNA AMP PROBE: CPT | Performed by: EMERGENCY MEDICINE

## 2025-05-17 PROCEDURE — 87651 STREP A DNA AMP PROBE: CPT | Performed by: EMERGENCY MEDICINE

## 2025-05-17 PROCEDURE — 25000003 PHARM REV CODE 250: Performed by: PHYSICIAN ASSISTANT

## 2025-05-17 RX ORDER — BENZONATATE 100 MG/1
100 CAPSULE ORAL ONCE
Status: COMPLETED | OUTPATIENT
Start: 2025-05-17 | End: 2025-05-17

## 2025-05-17 RX ORDER — DOXYCYCLINE 100 MG/1
100 CAPSULE ORAL 2 TIMES DAILY
Qty: 14 CAPSULE | Refills: 0 | Status: SHIPPED | OUTPATIENT
Start: 2025-05-17 | End: 2025-05-24

## 2025-05-17 RX ORDER — CETIRIZINE HYDROCHLORIDE 10 MG/1
10 TABLET ORAL ONCE
Status: COMPLETED | OUTPATIENT
Start: 2025-05-17 | End: 2025-05-17

## 2025-05-17 RX ORDER — CETIRIZINE HYDROCHLORIDE 10 MG/1
10 TABLET ORAL DAILY
Qty: 10 TABLET | Refills: 0 | Status: SHIPPED | OUTPATIENT
Start: 2025-05-17 | End: 2025-05-27

## 2025-05-17 RX ORDER — BENZONATATE 100 MG/1
100 CAPSULE ORAL 3 TIMES DAILY PRN
Qty: 20 CAPSULE | Refills: 0 | Status: SHIPPED | OUTPATIENT
Start: 2025-05-17

## 2025-05-17 RX ADMIN — BENZONATATE 100 MG: 100 CAPSULE ORAL at 06:05

## 2025-05-17 RX ADMIN — CETIRIZINE HYDROCHLORIDE 10 MG: 10 TABLET, FILM COATED ORAL at 06:05

## 2025-05-17 RX ADMIN — IBUPROFEN 600 MG: 400 TABLET ORAL at 06:05

## 2025-05-17 NOTE — ED PROVIDER NOTES
Encounter Date: 5/17/2025       History     Chief Complaint   Patient presents with    Cough     X 1 DAY    Chills     Parminder Treviño is a 49 y.o. male presenting for evaluation of runny nose, sinus congestion, sore throat and cough since last night.  No fever, but has had some associated chills.  Multiple sick contacts at work/home.  No difficulty breathing or shortness of breath.  No history of chronic lung disease.  He has taken OTC medication with mild improvement.  He has a past medical history of Hypertension, Mixed hyperlipidemia, and Obese body habitus.      The history is provided by the patient.     Review of patient's allergies indicates:  No Known Allergies  Past Medical History:   Diagnosis Date    Hypertension     Mixed hyperlipidemia     Obese body habitus      History reviewed. No pertinent surgical history.  No family history on file.  Social History[1]  Review of Systems   Constitutional:  Positive for chills. Negative for activity change, appetite change, fatigue and fever.   HENT:  Positive for congestion, rhinorrhea and sore throat.    Eyes:  Negative for visual disturbance.   Respiratory:  Positive for cough. Negative for chest tightness, shortness of breath and wheezing.    Cardiovascular:  Negative for chest pain and palpitations.   Gastrointestinal:  Negative for diarrhea, nausea and vomiting.   Musculoskeletal:  Negative for arthralgias and myalgias.   Skin:  Negative for rash.   Neurological:  Negative for weakness, light-headedness and headaches.       Physical Exam     Initial Vitals [05/17/25 1744]   BP Pulse Resp Temp SpO2   125/85 97 16 98.3 °F (36.8 °C) 95 %      MAP       --         Physical Exam    Nursing note and vitals reviewed.  Constitutional: He appears well-developed and well-nourished. He is not diaphoretic. No distress.   HENT:   Head: Normocephalic and atraumatic.   Nasal congestion and rhinorrhea noted.  Mild erythema noted to posterior oropharynx without edema or  exudate.     Eyes: Conjunctivae are normal.   Neck: Neck supple.   Normal range of motion.  Cardiovascular:  Normal rate, regular rhythm, normal heart sounds and intact distal pulses.     Exam reveals no gallop and no friction rub.       No murmur heard.  Pulmonary/Chest: Breath sounds normal. No respiratory distress. He has no wheezes. He has no rhonchi. He has no rales.   Equal, bilateral breath sounds noted without wheezing.     Musculoskeletal:         General: No tenderness or edema. Normal range of motion.      Cervical back: Normal range of motion and neck supple.     Neurological: He is alert and oriented to person, place, and time. He has normal strength. No sensory deficit.   Skin: Skin is warm and dry. No rash and no abscess noted. No erythema.   Psychiatric: He has a normal mood and affect.         ED Course   Procedures  Labs Reviewed   INFLUENZA A & B BY MOLECULAR - Normal       Result Value    INFLUENZA A MOLECULAR Negative      INFLUENZA B MOLECULAR  Negative     GROUP A STREP, MOLECULAR - Normal    Group A Strep Molecular Negative     SARS-COV-2 RNA AMPLIFICATION, QUAL - Normal    SARS COV-2 Molecular Negative            Imaging Results              X-Ray Chest PA And Lateral (Final result)  Result time 05/17/25 19:24:49      Final result by Nick Daniel MD (05/17/25 19:24:49)                   Impression:      No evidence of acute cardiopulmonary process.      Electronically signed by: Nick Daniel  Date:    05/17/2025  Time:    19:24               Narrative:    EXAMINATION:  XR CHEST PA AND LATERAL    CLINICAL HISTORY:  Cough, unspecified    TECHNIQUE:  PA and lateral views of the chest were performed.    COMPARISON:  None available.    FINDINGS:  Heart size and pulmonary vasculature are within normal limits.  No evidence of focal consolidation, pneumothorax, or pleural effusion.  No evidence of acute osseous process.                        Wet Read by Jimmy Borja MD  (05/17/25 19:15:56, [facility] - Emergency Dept, Emergency Medicine)    Increased interstitial markings and possible opacity in the right lower lobe concerning for acute infiltrative process                      Wet Read by Jimmy Borja MD (05/17/25 19:15:48, [facility] Emergency Dept, Emergency Medicine)    Increased interstitial markings and possible opacity in the right lower lobe concerning for acute infiltrate or process                                     Medications   benzonatate capsule 100 mg (100 mg Oral Given 5/17/25 1840)   cetirizine tablet 10 mg (10 mg Oral Given 5/17/25 1840)   ibuprofen tablet 600 mg (600 mg Oral Given 5/17/25 1840)     Medical Decision Making  Differential Diagnosis:   Influenza  Pneumonia  Strep pharyngitis  Meningitis  Viral syndrome    Pt emergently evaluated here in the ED.    Influenza, COVID-19 and group a strep are negative.  Chest x-ray is read as negative; however, there just seem to be some haziness in the right middle lobe, which may indicate developing pneumonia.  We will treat with doxycycline and other medications to help with symptomatic management.  He voices understanding and is agreeable with the plan.  He is given specific return precautions.  Patient has no hypoxia or respiratory distress on exam.    Amount and/or Complexity of Data Reviewed  Labs: ordered. Decision-making details documented in ED Course.  Radiology: ordered and independent interpretation performed. Decision-making details documented in ED Course.    Risk  OTC drugs.  Prescription drug management.                                      Clinical Impression:  Final diagnoses:  [R05.9] Cough  [R09.81] Sinus congestion (Primary)  [J18.9] Pneumonia of right middle lobe due to infectious organism          ED Disposition Condition    Discharge Stable          ED Prescriptions       Medication Sig Dispense Start Date End Date Auth. Provider    doxycycline  (VIBRAMYCIN) 100 MG Cap Take 1 capsule (100 mg total) by mouth 2 (two) times daily. for 7 days 14 capsule 5/17/2025 5/24/2025 Maria Isabel Wadsworth PA-C    benzonatate (TESSALON) 100 MG capsule Take 1 capsule (100 mg total) by mouth 3 (three) times daily as needed for Cough. 20 capsule 5/17/2025 -- Maria Isabel Wadsworth PA-C    cetirizine (ZYRTEC) 10 MG tablet Take 1 tablet (10 mg total) by mouth once daily. for 10 days 10 tablet 5/17/2025 5/27/2025 Maria Isabel Wadsworth PA-C          Follow-up Information       Follow up With Specialties Details Why Contact Info Additional Information    [facility] - Emergency Dept Emergency Medicine  As needed, If symptoms worsen [address] [facility] [address] [phone] 1st floor    Leticia Soni, FNP-C Family Medicine  As needed [address] [facility] [address]  [phone]                    [1]   Social History  Tobacco Use    Smoking status: Unknown        Maria Isabel Wadsworth PA-C  05/17/25 1934

## 2025-05-17 NOTE — Clinical Note
"Parminder Glasgow" Paramore was seen and treated in our emergency department on 5/17/2025.  He may return to work on 05/24/2025.       If you have any questions or concerns, please don't hesitate to call.      Maria Isabel Wadsworth PA-C"